# Patient Record
Sex: MALE | Race: ASIAN | NOT HISPANIC OR LATINO
[De-identification: names, ages, dates, MRNs, and addresses within clinical notes are randomized per-mention and may not be internally consistent; named-entity substitution may affect disease eponyms.]

---

## 2022-05-24 ENCOUNTER — NON-APPOINTMENT (OUTPATIENT)
Age: 72
End: 2022-05-24

## 2022-05-24 ENCOUNTER — APPOINTMENT (OUTPATIENT)
Dept: CARDIOLOGY | Facility: CLINIC | Age: 72
End: 2022-05-24
Payer: MEDICAID

## 2022-05-24 VITALS
HEIGHT: 67.72 IN | HEART RATE: 65 BPM | SYSTOLIC BLOOD PRESSURE: 124 MMHG | BODY MASS INDEX: 19.93 KG/M2 | TEMPERATURE: 98.3 F | RESPIRATION RATE: 18 BRPM | DIASTOLIC BLOOD PRESSURE: 73 MMHG | WEIGHT: 130 LBS | OXYGEN SATURATION: 97 %

## 2022-05-24 DIAGNOSIS — R07.89 OTHER CHEST PAIN: ICD-10-CM

## 2022-05-24 PROBLEM — Z00.00 ENCOUNTER FOR PREVENTIVE HEALTH EXAMINATION: Status: ACTIVE | Noted: 2022-05-24

## 2022-05-24 PROCEDURE — 93000 ELECTROCARDIOGRAM COMPLETE: CPT

## 2022-05-24 PROCEDURE — 99204 OFFICE O/P NEW MOD 45 MIN: CPT

## 2022-05-30 PROBLEM — R07.89 CHEST DISCOMFORT: Status: ACTIVE | Noted: 2022-05-30

## 2022-06-28 ENCOUNTER — APPOINTMENT (OUTPATIENT)
Dept: CARDIOLOGY | Facility: CLINIC | Age: 72
End: 2022-06-28

## 2022-06-28 VITALS
HEART RATE: 88 BPM | SYSTOLIC BLOOD PRESSURE: 138 MMHG | OXYGEN SATURATION: 99 % | BODY MASS INDEX: 19.63 KG/M2 | DIASTOLIC BLOOD PRESSURE: 74 MMHG | TEMPERATURE: 98.6 F | RESPIRATION RATE: 18 BRPM | WEIGHT: 128 LBS

## 2022-06-28 DIAGNOSIS — E78.5 HYPERLIPIDEMIA, UNSPECIFIED: ICD-10-CM

## 2022-06-28 PROCEDURE — 99213 OFFICE O/P EST LOW 20 MIN: CPT

## 2022-06-28 NOTE — HISTORY OF PRESENT ILLNESS
[FreeTextEntry1] : 71 year-old male with PAF since 12/2021, stroke 4/30/22, CKD (1.95), HLD, presents for evaluation of CP and PAF.  \par \par Patient reports that he was diagnosed with AFIB in 12/2021 and was started on Eliquis 5 mg BID.  On 4/30/22 he was admitted to [facility] with dizziness and HA.  He was diagnosed with stroke.  He was discharged on Xarelto 15 mg and Multaq 400 mg BID.  He had a repeat brain MRI and it showed acute to subacute infarct of the left frontal centrum semiovale.  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  \par \par Patient reports that for the past few years he has been experiencing lower CP radiating to the back, not related to exertion,

## 2022-06-30 PROBLEM — E78.5 HLD (HYPERLIPIDEMIA): Status: ACTIVE | Noted: 2022-05-30

## 2022-08-05 ENCOUNTER — APPOINTMENT (OUTPATIENT)
Dept: CARDIOLOGY | Facility: CLINIC | Age: 72
End: 2022-08-05

## 2022-08-05 VITALS
HEART RATE: 84 BPM | WEIGHT: 130 LBS | BODY MASS INDEX: 19.93 KG/M2 | TEMPERATURE: 98.4 F | RESPIRATION RATE: 18 BRPM | OXYGEN SATURATION: 97 % | DIASTOLIC BLOOD PRESSURE: 72 MMHG | SYSTOLIC BLOOD PRESSURE: 110 MMHG

## 2022-08-05 DIAGNOSIS — R00.2 PALPITATIONS: ICD-10-CM

## 2022-08-05 DIAGNOSIS — I48.91 UNSPECIFIED ATRIAL FIBRILLATION: ICD-10-CM

## 2022-08-05 PROCEDURE — 99213 OFFICE O/P EST LOW 20 MIN: CPT

## 2022-08-05 NOTE — HISTORY OF PRESENT ILLNESS
[FreeTextEntry1] : 6/28/22 - Patient reports gum bleeding.  Patient reports epigastric discomfort yesterday.  Patient denies CP, SOB, or palpitations.  Patient reports occasional dizziness.  \par \par 5/24/22 - Patient reports that he was diagnosed with AFIB in 12/2021 and was started on Eliquis 5 mg BID.  On 4/30/22 he was admitted to [facility] with dizziness and HA.  He was diagnosed with stroke.  He was discharged on Xarelto 15 mg and Multaq 400 mg BID.  He had a repeat brain MRI and it showed acute to subacute infarct of the left frontal centrum semiovale.  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  \par \par Patient reports that for the past few years he has been experiencing lower CP radiating to the back, not related to exertion,

## 2022-08-05 NOTE — REASON FOR VISIT
[FreeTextEntry1] : 71 year-old male with PAF since 12/2021, stroke 4/30/22, CKD (1.95), HLD, presents for followup.  \par \par Patient was last seen on 5/25/22 for evaluation of CP and PAF.   \par \par He is on Xarelto 15 mg and Multaq 400 mg BID of PAF.  He is on Atorvastatin 40 mg for stroke prevention.

## 2022-08-16 ENCOUNTER — NON-APPOINTMENT (OUTPATIENT)
Age: 72
End: 2022-08-16

## 2022-08-21 PROBLEM — R00.2 PALPITATIONS: Status: ACTIVE | Noted: 2022-08-21

## 2022-08-21 PROBLEM — I48.91 ATRIAL FIBRILLATION: Status: ACTIVE | Noted: 2022-05-24
